# Patient Record
Sex: FEMALE | Race: WHITE | NOT HISPANIC OR LATINO | Employment: FULL TIME | URBAN - METROPOLITAN AREA
[De-identification: names, ages, dates, MRNs, and addresses within clinical notes are randomized per-mention and may not be internally consistent; named-entity substitution may affect disease eponyms.]

---

## 2023-08-28 ENCOUNTER — PATIENT OUTREACH (OUTPATIENT)
Dept: ONCOLOGY | Facility: HOSPITAL | Age: 58
End: 2023-08-28
Payer: COMMERCIAL

## 2023-08-28 ENCOUNTER — HOSPITAL ENCOUNTER (OUTPATIENT)
Dept: MAMMOGRAPHY | Facility: HOSPITAL | Age: 58
Discharge: HOME | End: 2023-08-28
Payer: COMMERCIAL

## 2023-08-28 DIAGNOSIS — N63.20 MASS OF LEFT BREAST, UNSPECIFIED QUADRANT: Primary | ICD-10-CM

## 2023-08-28 DIAGNOSIS — N63.20 MASS OF LEFT BREAST, UNSPECIFIED QUADRANT: ICD-10-CM

## 2023-08-28 PROCEDURE — 88305 TISSUE EXAM BY PATHOLOGIST: CPT | Performed by: SURGERY

## 2023-08-28 PROCEDURE — 0 LIDOCAINE 1 % SOLUTION: Performed by: SURGERY

## 2023-08-28 PROCEDURE — A4648 IMPLANTABLE TISSUE MARKER: HCPCS

## 2023-08-28 RX ORDER — LIDOCAINE HYDROCHLORIDE 10 MG/ML
10 INJECTION, SOLUTION INFILTRATION; PERINEURAL ONCE
Status: COMPLETED | OUTPATIENT
Start: 2023-08-28 | End: 2023-08-28

## 2023-08-28 RX ORDER — LIDOCAINE HYDROCHLORIDE AND EPINEPHRINE 10; 10 MG/ML; UG/ML
10 INJECTION, SOLUTION INFILTRATION; PERINEURAL ONCE
Status: COMPLETED | OUTPATIENT
Start: 2023-08-28 | End: 2023-08-28

## 2023-08-28 RX ADMIN — LIDOCAINE HYDROCHLORIDE 10 ML: 10 INJECTION, SOLUTION INFILTRATION; PERINEURAL at 08:16

## 2023-08-28 RX ADMIN — LIDOCAINE HYDROCHLORIDE,EPINEPHRINE BITARTRATE 10 ML: 10; .01 INJECTION, SOLUTION INFILTRATION; PERINEURAL at 08:16

## 2023-08-29 ENCOUNTER — PATIENT OUTREACH (OUTPATIENT)
Dept: ONCOLOGY | Facility: HOSPITAL | Age: 58
End: 2023-08-29
Payer: COMMERCIAL

## 2023-08-29 LAB
CYTO UR: NORMAL
LAB AP CASE REPORT: NORMAL
LAB AP CLINICAL INFORMATION: NORMAL
PATH REPORT.FINAL DX SPEC: NORMAL
PATH REPORT.GROSS SPEC: NORMAL

## 2023-09-27 ENCOUNTER — TELEPHONE (OUTPATIENT)
Dept: SURGERY | Facility: CLINIC | Age: 58
End: 2023-09-27
Payer: COMMERCIAL

## 2023-09-27 NOTE — TELEPHONE ENCOUNTER
SPOKE TO PT AND SHE STATED THAT HER BX WAS NEGATING AND THAT SHE DIDN'T FEEL SHE NEEDED TO MAKE AN APPT BECAUSE NOTHING WAS BEING REMOVED, I CALLED REFERRING PROVIDER DANIAL MCCABE OFFICE AND LEFT A MESSAGE ON [employer]ING THAT PT DID NOT WISH TO SCHEDULE AN APPT, ANYTHING ELSE TO DO?

## 2023-09-27 NOTE — TELEPHONE ENCOUNTER
WE RECEIVED AN OUTSIDE REF FROM DANIAL CUELLAR FOR MASS OF BREAST TO DR YAN FOR BX CONSULT, RECORDS IN CARE EVERYWHERE, CALLED PT NO ANSWER, LMOM.

## 2023-09-27 NOTE — TELEPHONE ENCOUNTER
Caller: Kellie Rayo    Relationship: Self    Best call back number: [phone]     What is the best time to reach you: N/A    Who are you requesting to speak with (clinical staff, provider,  specific staff member): [facility]NA    Do you know the name of the person who called: MUNA    What was the call regarding: SCHEDULING, HUB UNABLE TO WARM TRANSFER    Is it okay if the provider responds through MyChart: NO